# Patient Record
Sex: FEMALE | Race: WHITE | NOT HISPANIC OR LATINO | Employment: FULL TIME | URBAN - METROPOLITAN AREA
[De-identification: names, ages, dates, MRNs, and addresses within clinical notes are randomized per-mention and may not be internally consistent; named-entity substitution may affect disease eponyms.]

---

## 2023-06-26 ENCOUNTER — APPOINTMENT (OUTPATIENT)
Dept: LAB | Facility: LAB | Age: 33
End: 2023-06-26
Payer: COMMERCIAL

## 2023-06-26 LAB
APPEARANCE, URINE: ABNORMAL
BACTERIA, URINE: ABNORMAL /HPF
BILIRUBIN, URINE: NEGATIVE
BLOOD, URINE: ABNORMAL
COLOR, URINE: YELLOW
GLUCOSE, URINE: NEGATIVE MG/DL
KETONES, URINE: NEGATIVE MG/DL
LEUKOCYTE ESTERASE, URINE: NEGATIVE
MUCUS, URINE: ABNORMAL /LPF
NITRITE, URINE: NEGATIVE
PH, URINE: 6 (ref 5–8)
PROTEIN, URINE: NEGATIVE MG/DL
RBC, URINE: 2 /HPF (ref 0–5)
SPECIFIC GRAVITY, URINE: 1.02 (ref 1–1.03)
SQUAMOUS EPITHELIAL CELLS, URINE: 6 /HPF
UROBILINOGEN, URINE: <2 MG/DL (ref 0–1.9)
WBC, URINE: 1 /HPF (ref 0–5)

## 2023-11-06 PROCEDURE — 88175 CYTOPATH C/V AUTO FLUID REDO: CPT

## 2023-11-06 PROCEDURE — 87624 HPV HI-RISK TYP POOLED RSLT: CPT

## 2023-11-08 ENCOUNTER — LAB REQUISITION (OUTPATIENT)
Dept: LAB | Facility: HOSPITAL | Age: 33
End: 2023-11-08
Payer: COMMERCIAL

## 2023-11-08 DIAGNOSIS — R87.810 CERVICAL HIGH RISK HUMAN PAPILLOMAVIRUS (HPV) DNA TEST POSITIVE: ICD-10-CM

## 2023-11-08 DIAGNOSIS — Z01.419 ENCOUNTER FOR GYNECOLOGICAL EXAMINATION (GENERAL) (ROUTINE) WITHOUT ABNORMAL FINDINGS: ICD-10-CM

## 2023-11-08 DIAGNOSIS — Z11.51 ENCOUNTER FOR SCREENING FOR HUMAN PAPILLOMAVIRUS (HPV): ICD-10-CM

## 2023-11-29 LAB

## 2024-01-02 ENCOUNTER — APPOINTMENT (OUTPATIENT)
Dept: NEPHROLOGY | Facility: CLINIC | Age: 34
End: 2024-01-02
Payer: MEDICARE

## 2024-01-11 ENCOUNTER — APPOINTMENT (OUTPATIENT)
Dept: RADIOLOGY | Facility: HOSPITAL | Age: 34
End: 2024-01-11
Payer: COMMERCIAL

## 2024-01-11 ENCOUNTER — APPOINTMENT (OUTPATIENT)
Dept: CARDIOLOGY | Facility: HOSPITAL | Age: 34
End: 2024-01-11
Payer: COMMERCIAL

## 2024-01-11 ENCOUNTER — HOSPITAL ENCOUNTER (EMERGENCY)
Facility: HOSPITAL | Age: 34
Discharge: HOME | End: 2024-01-12
Attending: EMERGENCY MEDICINE
Payer: COMMERCIAL

## 2024-01-11 VITALS
DIASTOLIC BLOOD PRESSURE: 70 MMHG | RESPIRATION RATE: 17 BRPM | SYSTOLIC BLOOD PRESSURE: 145 MMHG | OXYGEN SATURATION: 98 % | HEIGHT: 58 IN | BODY MASS INDEX: 28.69 KG/M2 | HEART RATE: 109 BPM | TEMPERATURE: 99.3 F | WEIGHT: 136.69 LBS

## 2024-01-11 DIAGNOSIS — R10.9 RIGHT FLANK PAIN: Primary | ICD-10-CM

## 2024-01-11 DIAGNOSIS — N94.89 ADNEXAL MASS: ICD-10-CM

## 2024-01-11 DIAGNOSIS — R10.31 RIGHT LOWER QUADRANT ABDOMINAL PAIN: ICD-10-CM

## 2024-01-11 LAB
ALBUMIN SERPL-MCNC: 4.5 G/DL (ref 3.5–5)
ALP BLD-CCNC: 70 U/L (ref 35–125)
ALT SERPL-CCNC: 10 U/L (ref 5–40)
ANION GAP SERPL CALC-SCNC: 12 MMOL/L
APPEARANCE UR: CLEAR
AST SERPL-CCNC: 17 U/L (ref 5–40)
BASOPHILS # BLD AUTO: 0.03 X10*3/UL (ref 0–0.1)
BASOPHILS NFR BLD AUTO: 0.4 %
BILIRUB SERPL-MCNC: 0.3 MG/DL (ref 0.1–1.2)
BILIRUB UR STRIP.AUTO-MCNC: NEGATIVE MG/DL
BUN SERPL-MCNC: 9 MG/DL (ref 8–25)
CALCIUM SERPL-MCNC: 9.8 MG/DL (ref 8.5–10.4)
CHLORIDE SERPL-SCNC: 98 MMOL/L (ref 97–107)
CO2 SERPL-SCNC: 24 MMOL/L (ref 24–31)
COLOR UR: COLORLESS
CREAT SERPL-MCNC: 0.6 MG/DL (ref 0.4–1.6)
EGFRCR SERPLBLD CKD-EPI 2021: >90 ML/MIN/1.73M*2
EOSINOPHIL # BLD AUTO: 0.07 X10*3/UL (ref 0–0.7)
EOSINOPHIL NFR BLD AUTO: 0.9 %
ERYTHROCYTE [DISTWIDTH] IN BLOOD BY AUTOMATED COUNT: 12 % (ref 11.5–14.5)
GLUCOSE SERPL-MCNC: 103 MG/DL (ref 65–99)
GLUCOSE UR STRIP.AUTO-MCNC: NORMAL MG/DL
HCG UR QL IA.RAPID: NEGATIVE
HCT VFR BLD AUTO: 38.2 % (ref 36–46)
HGB BLD-MCNC: 13.6 G/DL (ref 12–16)
IMM GRANULOCYTES # BLD AUTO: 0.01 X10*3/UL (ref 0–0.7)
IMM GRANULOCYTES NFR BLD AUTO: 0.1 % (ref 0–0.9)
KETONES UR STRIP.AUTO-MCNC: NEGATIVE MG/DL
LACTATE BLDV-SCNC: 1 MMOL/L (ref 0.4–2)
LEUKOCYTE ESTERASE UR QL STRIP.AUTO: NEGATIVE
LYMPHOCYTES # BLD AUTO: 2.15 X10*3/UL (ref 1.2–4.8)
LYMPHOCYTES NFR BLD AUTO: 28.5 %
MCH RBC QN AUTO: 32.2 PG (ref 26–34)
MCHC RBC AUTO-ENTMCNC: 35.6 G/DL (ref 32–36)
MCV RBC AUTO: 91 FL (ref 80–100)
MONOCYTES # BLD AUTO: 0.61 X10*3/UL (ref 0.1–1)
MONOCYTES NFR BLD AUTO: 8.1 %
NEUTROPHILS # BLD AUTO: 4.68 X10*3/UL (ref 1.2–7.7)
NEUTROPHILS NFR BLD AUTO: 62 %
NITRITE UR QL STRIP.AUTO: NEGATIVE
NRBC BLD-RTO: 0 /100 WBCS (ref 0–0)
PH UR STRIP.AUTO: 6 [PH]
PLATELET # BLD AUTO: 295 X10*3/UL (ref 150–450)
POTASSIUM SERPL-SCNC: 3.6 MMOL/L (ref 3.4–5.1)
PROT SERPL-MCNC: 8.3 G/DL (ref 5.9–7.9)
PROT UR STRIP.AUTO-MCNC: NEGATIVE MG/DL
RBC # BLD AUTO: 4.22 X10*6/UL (ref 4–5.2)
RBC # UR STRIP.AUTO: NEGATIVE /UL
SODIUM SERPL-SCNC: 134 MMOL/L (ref 133–145)
SP GR UR STRIP.AUTO: 1
UROBILINOGEN UR STRIP.AUTO-MCNC: NORMAL MG/DL
WBC # BLD AUTO: 7.6 X10*3/UL (ref 4.4–11.3)

## 2024-01-11 PROCEDURE — 76856 US EXAM PELVIC COMPLETE: CPT | Mod: FOREIGN READ | Performed by: RADIOLOGY

## 2024-01-11 PROCEDURE — 74176 CT ABD & PELVIS W/O CONTRAST: CPT | Mod: FOREIGN READ | Performed by: RADIOLOGY

## 2024-01-11 PROCEDURE — 2500000004 HC RX 250 GENERAL PHARMACY W/ HCPCS (ALT 636 FOR OP/ED): Performed by: PHYSICIAN ASSISTANT

## 2024-01-11 PROCEDURE — 87040 BLOOD CULTURE FOR BACTERIA: CPT | Mod: TRILAB | Performed by: PHYSICIAN ASSISTANT

## 2024-01-11 PROCEDURE — 74176 CT ABD & PELVIS W/O CONTRAST: CPT

## 2024-01-11 PROCEDURE — 36415 COLL VENOUS BLD VENIPUNCTURE: CPT | Performed by: PHYSICIAN ASSISTANT

## 2024-01-11 PROCEDURE — 80053 COMPREHEN METABOLIC PANEL: CPT | Performed by: PHYSICIAN ASSISTANT

## 2024-01-11 PROCEDURE — 76856 US EXAM PELVIC COMPLETE: CPT | Mod: FR

## 2024-01-11 PROCEDURE — 81025 URINE PREGNANCY TEST: CPT | Performed by: PHYSICIAN ASSISTANT

## 2024-01-11 PROCEDURE — 93005 ELECTROCARDIOGRAM TRACING: CPT

## 2024-01-11 PROCEDURE — 81003 URINALYSIS AUTO W/O SCOPE: CPT | Performed by: PHYSICIAN ASSISTANT

## 2024-01-11 PROCEDURE — 83605 ASSAY OF LACTIC ACID: CPT | Performed by: PHYSICIAN ASSISTANT

## 2024-01-11 PROCEDURE — 96374 THER/PROPH/DIAG INJ IV PUSH: CPT

## 2024-01-11 PROCEDURE — 76830 TRANSVAGINAL US NON-OB: CPT | Mod: FOREIGN READ | Performed by: RADIOLOGY

## 2024-01-11 PROCEDURE — 2500000004 HC RX 250 GENERAL PHARMACY W/ HCPCS (ALT 636 FOR OP/ED): Performed by: CLINICAL NURSE SPECIALIST

## 2024-01-11 PROCEDURE — 85025 COMPLETE CBC W/AUTO DIFF WBC: CPT | Performed by: PHYSICIAN ASSISTANT

## 2024-01-11 PROCEDURE — 99285 EMERGENCY DEPT VISIT HI MDM: CPT | Mod: 25 | Performed by: EMERGENCY MEDICINE

## 2024-01-11 RX ORDER — ACETAMINOPHEN 325 MG/1
650 TABLET ORAL ONCE
Status: COMPLETED | OUTPATIENT
Start: 2024-01-11 | End: 2024-01-11

## 2024-01-11 RX ORDER — KETOROLAC TROMETHAMINE 30 MG/ML
15 INJECTION, SOLUTION INTRAMUSCULAR; INTRAVENOUS ONCE
Status: COMPLETED | OUTPATIENT
Start: 2024-01-11 | End: 2024-01-11

## 2024-01-11 RX ADMIN — ACETAMINOPHEN 650 MG: 325 TABLET ORAL at 19:17

## 2024-01-11 RX ADMIN — KETOROLAC TROMETHAMINE 15 MG: 30 INJECTION INTRAMUSCULAR; INTRAVENOUS at 19:17

## 2024-01-11 RX ADMIN — SODIUM CHLORIDE 1000 ML: 900 INJECTION, SOLUTION INTRAVENOUS at 18:29

## 2024-01-11 ASSESSMENT — PAIN SCALES - GENERAL: PAINLEVEL_OUTOF10: 6

## 2024-01-11 ASSESSMENT — PAIN - FUNCTIONAL ASSESSMENT: PAIN_FUNCTIONAL_ASSESSMENT: 0-10

## 2024-01-11 ASSESSMENT — COLUMBIA-SUICIDE SEVERITY RATING SCALE - C-SSRS
6. HAVE YOU EVER DONE ANYTHING, STARTED TO DO ANYTHING, OR PREPARED TO DO ANYTHING TO END YOUR LIFE?: NO
2. HAVE YOU ACTUALLY HAD ANY THOUGHTS OF KILLING YOURSELF?: NO
1. IN THE PAST MONTH, HAVE YOU WISHED YOU WERE DEAD OR WISHED YOU COULD GO TO SLEEP AND NOT WAKE UP?: NO

## 2024-01-11 NOTE — ED TRIAGE NOTES
Triage Note:  Date of Encounter: [unfilled]   MRN: [id number]    I saw the patient as the clinician in triage and performed a brief history and physical exam established acuity and order appropriate test to develop basic plan of care.  Patient will be seen by DARIANA and/or the physician who will independently evaluate  The patient.  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  No specific CVA tenderness    Plan/MDM:  Labs, CT, IV fluids and continued bedside monitoring for concern of pyelonephritis UTI or kidney stone        Please see subsequent provider note for details and disposition

## 2024-01-11 NOTE — Clinical Note
Mary PUENTE was seen and treated in our emergency department on 1/11/2024.  She may return to work on 01/13/2024.       If you have any questions or concerns, please don't hesitate to call.      Philip Sepulveda MD

## 2024-01-11 NOTE — ED PROVIDER NOTES
Department of Emergency Medicine   ED  Provider Note  Admit Date/RoomTime: 1/11/2024  6:21 PM  ED Room: [facility]        History of Present Illness:  Chief Complaint   Patient presents with    Flank Pain     Pt complains of right flank pain for a couple days that has gotten worse today.  Was seen at urgent care.           Mary PUENTE is a 33 y.o. female history of solitary kidney right side presenting to the ED for right flank pain, right lower abdominal pain, beginning 2 days.  Patient presents to the emergency department after being seen evaluated in the urgent care with a negative urine right flank pain right abdominal pain.  This is the location of her solitary kidney.  Reports has had pain in the past increases with stress but this has been more constant.  She describes it as sharp occasionally but a constant dull pain.  No nausea no vomiting no fever no cough congestion runny nose sore throat no pressure burning or frequency with urination no blood in her urine she has no concern for sexually transmitted diseases.  She is sexually active with 1 partner.    Patient was initially seen in triage orders placed by triage provider  Review of Systems:   Pertinent positives and negatives are stated within HPI, all other systems reviewed and are negative.        --------------------------------------------- PAST HISTORY ---------------------------------------------  Past Medical History:  has no past medical history on file.  Past Surgical History:  has no past surgical history on file.  Social History:    Family History: family history is not on file.. Unless otherwise noted, family history is non contributory  The patient’s home medications have been reviewed.  Allergies: Latex        ---------------------------------------------------PHYSICAL EXAM--------------------------------------    GENERAL APPEARANCE: Awake and alert.   VITAL SIGNS: As per the nurses' triage record.  Low-grade temperature noted.  HEENT:  "Normocephalic, atraumatic. Extraocular muscles are intact. Pupils equal round and reactive to light. Conjunctiva are pink. Negative scleral icterus. Mucous membranes are moist. Tongue in the midline. Pharynx was without erythema or exudates, uvula midline  NECK: Soft Nontender and supple, full gross ROM, no meningeal signs.  CHEST: Nontender to palpation. Clear to auscultation bilaterally. No rales, rhonchi, or wheezing.   HEART: S1, S2. Regular rate and rhythm. No murmurs, gallops or rubs.  Strong and equal pulses in the extremities.   ABDOMEN: Soft, mild tenderness in the right lower quadrant.  No rebound or guarding negative for Carlos sign.  Nondistended, positive bowel sounds, no palpable masses.  Back: No pain palpation of cervical thoracic or lumbar spine no step-offs crepitus or bruising no CVA tenderness no saddle paresthesias noted.  MUSCULCSKELETAL: The calves are nontender to palpation. Full gross active range of motion. Ambulating on own with no acute difficulties  NEUROLOGICAL: Awake, alert and oriented x 3. Power intact in the upper and lower extremities. Sensation is intact to light touch in the upper and lower extremities.   IMMUNOLOGICAL: No lymphatic streaking noted   DERM: No petechiae, rashes, or ecchymoses.          ------------------------- NURSING NOTES AND VITALS REVIEWED ---------------------------  The nursing notes within the ED encounter and vital signs as below have been reviewed by myself  /70   Pulse 109   Temp 37.4 °C (99.3 °F)   Resp 17   Ht 1.473 m (4' 10\")   Wt 62 kg (136 lb 11 oz)   SpO2 98%   BMI 28.57 kg/m²     Oxygen Saturation Interpretation: Normal      The patient’s available past medical records and past encounters were reviewed.          -----------------------DIAGNOSTIC RESULTS------------------------  LABS:    Labs Reviewed   COMPREHENSIVE METABOLIC PANEL - Abnormal       Result Value    Glucose 103 (*)     Sodium 134      Potassium 3.6      Chloride 98   "    Bicarbonate 24      Urea Nitrogen 9      Creatinine 0.60      eGFR >90      Calcium 9.8      Albumin 4.5      Alkaline Phosphatase 70      Total Protein 8.3 (*)     AST 17      Bilirubin, Total 0.3      ALT 10      Anion Gap 12     URINALYSIS WITH REFLEX CULTURE AND MICROSCOPIC - Abnormal    Color, Urine Colorless (*)     Appearance, Urine Clear      Specific Gravity, Urine 1.003 (*)     pH, Urine 6.0      Protein, Urine NEGATIVE      Glucose, Urine Normal      Blood, Urine NEGATIVE      Ketones, Urine NEGATIVE      Bilirubin, Urine NEGATIVE      Urobilinogen, Urine Normal      Nitrite, Urine NEGATIVE      Leukocyte Esterase, Urine NEGATIVE     HCG, URINE, QUALITATIVE - Normal    HCG, Urine NEGATIVE     BLOOD GAS LACTIC ACID, VENOUS - Normal    POCT Lactate, Venous 1.0     BLOOD CULTURE   BLOOD CULTURE   CBC WITH AUTO DIFFERENTIAL    WBC 7.6      nRBC 0.0      RBC 4.22      Hemoglobin 13.6      Hematocrit 38.2      MCV 91      MCH 32.2      MCHC 35.6      RDW 12.0      Platelets 295      Neutrophils % 62.0      Immature Granulocytes %, Automated 0.1      Lymphocytes % 28.5      Monocytes % 8.1      Eosinophils % 0.9      Basophils % 0.4      Neutrophils Absolute 4.68      Immature Granulocytes Absolute, Automated 0.01      Lymphocytes Absolute 2.15      Monocytes Absolute 0.61      Eosinophils Absolute 0.07      Basophils Absolute 0.03     URINALYSIS WITH REFLEX CULTURE AND MICROSCOPIC    Narrative:     The following orders were created for panel order Urinalysis with Reflex Culture and Microscopic.  Procedure                               Abnormality         Status                     ---------                               -----------         ------                     Urinalysis with Reflex C...[[id number]]  Abnormal            Final result               Extra Urine Gray Tube[[id number]]                                                         Please view results for these tests on the individual orders.   EXTRA  URINE GRAY TUBE       As interpreted by me, the above displayed labs are abnormal. All other labs obtained during this visit were within normal range or not returned as of this dictation.          CT abdomen pelvis wo IV contrast   Final Result   There is a 3.4 cm smooth bordered relatively low-density mass superior   to the bladder and anterior to the fundus of the uterus that is new   when compared to the prior CT.  This could represent left adnexal mass   but this is not definite..  The CT abdomen and pelvis otherwise is   unchanged.  Again visible is absence of the left kidney.  There are no   right renal calculi and no evidence of hydronephrosis.   Signed by Rod Gonzalez MD      US PELVIS TRANSABDOMINAL WITH TRANSVAGINAL    (Results Pending)           CT abdomen pelvis wo IV contrast   Final Result   There is a 3.4 cm smooth bordered relatively low-density mass superior   to the bladder and anterior to the fundus of the uterus that is new   when compared to the prior CT.  This could represent left adnexal mass   but this is not definite..  The CT abdomen and pelvis otherwise is   unchanged.  Again visible is absence of the left kidney.  There are no   right renal calculi and no evidence of hydronephrosis.   Signed by Rod Gonzalez MD      US PELVIS TRANSABDOMINAL WITH TRANSVAGINAL    (Results Pending)           ------------------------------ ED COURSE/MEDICAL DECISION MAKING----------------------  Medical Decision Making:   Exam: A medically appropriate exam performed, outlined above, given the known history and presentation.    History obtained from: Review of medical record nursing notes patient      Social Determinants of Health considered during this visit: Was at home with family      PAST MEDICAL HISTORY/Chronic Conditions Affecting Care     has no past medical history on file.       CC/HPI Summary, Social Determinants of health, Records Reviewed, DDx, testing done/not done, ED Course, Reassessment,  disposition considerations/shared decision making with patient, consults, disposition:   Presents with right flank pain right lower abdominal pain  Plan  Normal saline  CT abdomen pelvis There is a 3.4 cm smooth bordered relatively low-density mass superior  to the bladder and anterior to the fundus of the uterus that is new  when compared to the prior CT.  This could represent left adnexal mass  but this is not definite..  The CT abdomen and pelvis otherwise is  unchanged.  Again visible is absence of the left kidney.  There are no  right renal calculi and no evidence of hydronephrosis.  Blood cultures  Lactic acid  CBC  CMP  Urine  Pregnancy  Toradol  Tylenol  Medical Decision Making/Differential Diagnosis:  Differentials include not limited to kidney stone versus UTI versus pregnancy versus acute appendicitis versus electrolyte abnormality versus dehydration patient denies any concern for any STD denies vaginal complaints.  Review:  Pregnancy negative  Glucose 103  Electrolytes within normal limits  Creatinine 0.6  BUN 9  LFTs within normal limits  Lactic acid 1  Urine is negative for leukocytes nitrites and blood  White blood cell count 7.6  Hemoglobin 13.6  Patient presented with right lower quadrant pain electrolytes within normal limits.  Normal renal function.  LFTs within normal limits lactic acid normal.  Urine is not consistent with UTI no blood.  Patient is not anemic.  She is not pregnant.  CT of the abdomen pelvis showed a 3.4 cm smooth bordered relatively low-density mass superior to the bladder and anterior to the fundus of the uterus that is new from her previous CT.  Concerning for adnexal mass but could not be definite.  Ultrasound ordered ultrasound is pending at this time.  Appendix normal on exam patient seen and evaluated with attending physician Dr. Goins.   Due to the end of my shift [id number] report was given to oncoming physician Dr. Sepulveda .  Will follow-up on ultrasound results and  discharge planning.  Patient is been updated on plan of care.  Agreeable to plan.  PROCEDURES  Unless otherwise noted below, none      CONSULTS:   None      ED Course as of 01/11/24 2244   Thu Jan 11, 2024 2238 Patient updated on plan of care.  Agreeable to plan awaiting ultrasound results. [TB]      ED Course User Index  [TB] EDWARD Ahmadi-CNP         Diagnoses as of 01/11/24 2244   Right flank pain   Right lower quadrant abdominal pain         This patient has remained hemodynamically stable during their ED course.      Critical Care: none       Counseling:  The emergency provider has spoken with the patient and discussed today’s results, in addition to providing specific details for the plan of care and counseling regarding the diagnosis and prognosis.  Questions are answered at this time and they are agreeable with the plan.         --------------------------------- IMPRESSION AND DISPOSITION ---------------------------------    IMPRESSION  1. Right flank pain    2. Right lower quadrant abdominal pain        DISPOSITION  Disposition: Discharge pending    Patient condition is stable        NOTE: This report was transcribed using voice recognition software. Every effort was made to ensure accuracy; however, inadvertent computerized transcription errors may be present      JANUSZ Ahmadi  01/11/24 2244

## 2024-01-12 LAB
ATRIAL RATE: 115 BPM
P AXIS: 40 DEGREES
P OFFSET: 204 MS
P ONSET: 147 MS
PR INTERVAL: 154 MS
Q ONSET: 224 MS
QRS COUNT: 19 BEATS
QRS DURATION: 76 MS
QT INTERVAL: 310 MS
QTC CALCULATION(BAZETT): 428 MS
QTC FREDERICIA: 384 MS
R AXIS: 42 DEGREES
T AXIS: 21 DEGREES
T OFFSET: 379 MS
VENTRICULAR RATE: 115 BPM

## 2024-01-12 NOTE — ED PROVIDER NOTES
HPI   Chief Complaint   Patient presents with    Flank Pain     Pt complains of right flank pain for a couple days that has gotten worse today.  Was seen at urgent care.         HPI       Patient was signed out to me awaiting results of ultrasound.             Johnny Coma Scale Score: 15                  Patient History   No past medical history on file.  No past surgical history on file.  No family history on file.  Social History     Tobacco Use    Smoking status: Not on file    Smokeless tobacco: Not on file   Substance Use Topics    Alcohol use: Not on file    Drug use: Not on file       Physical Exam   ED Triage Vitals [01/11/24 1805]   Temp Heart Rate Resp BP   37.4 °C (99.3 °F) (!) 137 16 (!) 168/115      SpO2 Temp src Heart Rate Source Patient Position   100 % -- -- --      BP Location FiO2 (%)     -- --       Physical Exam    ED Course & [facility]   ED Course as of 01/12/24 0001   Thu Jan 11, 2024 2231 Patient updated on plan of care.  Agreeable to plan awaiting ultrasound results. [TB]      ED Course User Index  [TB] Carline Garber, APRN-CNP         Diagnoses as of 01/12/24 0001   Right flank pain   Right lower quadrant abdominal pain   Adnexal mass       Pelvic ultrasound was ordered by me interpreted by radiology.    IMPRESSION:  Septate uterus.  4.1 cm nonspecific left adnexal lesion.  This does appear to contain  some vascularity.  Would advise a short follow-up in 6 weeks.  If this  does not resolve other evaluation would be indicated.  Bilateral ovaries not visualized.      Ultrasound appears to show a slightly vascular left adnexal lesion.  Combination of this and the CT was felt to be due to hemorrhagic cyst.  Ovaries otherwise not identified.  Her pain however is most on the right side.  Her workup otherwise is negative.  No signs of infection, no signs of kidney stone, pyelonephritis or appendicitis.  Otherwise she appears well.  Resting comfortably here.  At this point will discharge home.  She  does have a gynecologist she follows with regularly at [facility].  I advised her to contact them in the morning to arrange immediate follow-up.  She will need repeat ultrasound.  Will discharge home and follow-up with OB/GYN    Medical Decision Making      Procedure  Procedures     Philip Sepulveda MD  01/12/24 0001

## 2024-01-12 NOTE — PROGRESS NOTES
Attestation note/supervisory note for ANTWAN Garber      The patient is a 33-year-old female presenting to the emergency department for evaluation of right-sided flank pain and right lower quadrant abdominal pain.  She has had symptoms for the past 2 to 3 days.  It is a dull aching pain.  No specific better or worse.  No radiation.  Fairly constant.  She denies any nausea, vomiting or diarrhea.  No urinary complaints.  No vaginal discharge.  No diarrhea or constipation.  No sick contacts or recent travel.  No previous abdominal surgeries.  All pertinent positives and negatives are recorded above.  All other systems reviewed and otherwise negative.  Vital signs with hypertension and tachycardia but otherwise within normal limits.  Physical exam with a well-nourished well-developed female in no acute distress.  HEENT exam within normal limits.  She has no evidence of airway compromise or respiratory distress.  Abdominal exam with mild tenderness to palpation in the right lower quadrant.  No rebound or guarding.  No palpable masses.  She has no focal midline neck or back pain with palpation.  She has no gross motor, neurologic or vascular deficits on exam.  She is able to ambulate without difficulty.      EKG with sinus tachycardia 115 bpm, normal axis, normal voltage, normal ST segment, normal T waves      IV fluids, IV Toradol and oral acetaminophen ordered.      Diagnostic labs without significant abnormality      lactic acid 1.0      CT abdomen pelvis wo IV contrast   Final Result   There is a 3.4 cm smooth bordered relatively low-density mass superior   to the bladder and anterior to the fundus of the uterus that is new   when compared to the prior CT.  This could represent left adnexal mass   but this is not definite..  The CT abdomen and pelvis otherwise is   unchanged.  Again visible is absence of the left kidney.  There are no   right renal calculi and no evidence of hydronephrosis.   Signed by Rod Gonzalez MD       US PELVIS TRANSABDOMINAL WITH TRANSVAGINAL    (Results Pending)        The CT abdomen pelvis shows evidence of a low-density mass superior to the bladder and anterior to the uterus that was not seen on previous imaging.  An ultrasound was ordered but was pending at the time of my departure.        Impression/diagnosis  Right lower quadrant abdominal pain  Right-sided flank pain  Hypertension, unspecified      I personally saw the patient and performed a substantive portion of the visit including all aspects of the medical decision making.      I reviewed the results of the diagnostic labs and diagnostic imaging.  Formal radiology reading was completed by the radiologist      Pt care turned over to Dr Sepulveda with results of the pelvic ultrasound pending    Luisa Goins MD

## 2024-01-12 NOTE — DISCHARGE INSTRUCTIONS
Contact your OB/GYN in the morning to arrange immediate follow-up.  Return to the ER immediately for any new or worsening symptoms.

## 2024-01-16 LAB
BACTERIA BLD CULT: NORMAL
BACTERIA BLD CULT: NORMAL

## 2024-03-11 ENCOUNTER — OFFICE VISIT (OUTPATIENT)
Dept: PRIMARY CARE | Facility: CLINIC | Age: 34
End: 2024-03-11
Payer: MEDICARE

## 2024-03-11 VITALS
DIASTOLIC BLOOD PRESSURE: 93 MMHG | BODY MASS INDEX: 29.18 KG/M2 | SYSTOLIC BLOOD PRESSURE: 143 MMHG | WEIGHT: 139 LBS | HEIGHT: 58 IN | HEART RATE: 95 BPM | OXYGEN SATURATION: 98 %

## 2024-03-11 DIAGNOSIS — Q60.0 UNILATERAL CONGENITAL ABSENCE OF KIDNEY: ICD-10-CM

## 2024-03-11 DIAGNOSIS — Z76.89 ENCOUNTER TO ESTABLISH CARE: Primary | ICD-10-CM

## 2024-03-11 DIAGNOSIS — Z82.61 FAMILY HISTORY OF RHEUMATOID ARTHRITIS: ICD-10-CM

## 2024-03-11 DIAGNOSIS — Z13.220 LIPID SCREENING: ICD-10-CM

## 2024-03-11 DIAGNOSIS — M25.50 ARTHRALGIA, UNSPECIFIED JOINT: ICD-10-CM

## 2024-03-11 DIAGNOSIS — Z78.9 VEGETARIAN: ICD-10-CM

## 2024-03-11 PROCEDURE — 99214 OFFICE O/P EST MOD 30 MIN: CPT | Performed by: NURSE PRACTITIONER

## 2024-03-11 PROCEDURE — 1036F TOBACCO NON-USER: CPT | Performed by: NURSE PRACTITIONER

## 2024-03-11 NOTE — PROGRESS NOTES
"Subjective   Patient ID: Mary Carlos is a 33 y.o. female who presents for New Patient Visit (Patient is here today to establish care with a new pcp. No complaints. ).    33 year old female here to establish care. Medical HX significant for chronic joint pain and congenital unilateral kidney.   She sees nephro q year. Labs reviewed- Protein in blood work, neg in urine.   Family HX reviewed- mom had RA, lung Ca mets.   Sister had Thyroid cancer treated.   She takes no meds  No nicotene.   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 She has a Septate uterus and wants surgery in the near future. Does not have children and does not want surgery to increase chance of pregnancy.   Askign for labs to be ordered so she gets them prior to her Nephro OV scheduled in June         Review of Systems    Objective   BP (!) 143/93   Pulse 95   Ht 1.473 m (4' 10\")   Wt 63 kg (139 lb)   SpO2 98%   BMI 29.05 kg/m²     Physical Exam  Vitals and nursing note reviewed.   Constitutional:       General: She is not in acute distress.     Appearance: Normal appearance. She is normal weight.   HENT:      Head: Normocephalic and atraumatic.      Right Ear: External ear normal.      Left Ear: External ear normal.      Nose: Nose normal.      Mouth/Throat:      Mouth: Mucous membranes are moist.      Pharynx: Oropharynx is clear.   Eyes:      Extraocular Movements: Extraocular movements intact.      Conjunctiva/sclera: Conjunctivae normal.      Pupils: Pupils are equal, round, and reactive to light.   Neck:      Vascular: No carotid bruit.   Cardiovascular:      Rate and Rhythm: Normal rate and regular rhythm.      Pulses: Normal pulses.      Heart sounds: Normal heart sounds.   Pulmonary:      Effort: Pulmonary effort is normal.      Breath sounds: Normal breath sounds.   Musculoskeletal:      Cervical back: Normal range of motion and neck supple.   Lymphadenopathy:      Cervical: No cervical adenopathy.   Skin:     General: " Skin is warm and dry.      Capillary Refill: Capillary refill takes less than 2 seconds.   Neurological:      Mental Status: She is alert and oriented to person, place, and time.   Psychiatric:         Mood and Affect: Mood normal.         Behavior: Behavior normal.         Thought Content: Thought content normal.         Judgment: Judgment normal.         Assessment/Plan   Diagnoses and all orders for this visit:  Encounter to establish care  Unilateral congenital absence of kidney  Comments:  Followed by nephrology.  Orders:  -     Vitamin D 25-Hydroxy,Total (for eval of Vitamin D levels); Future  -     Comprehensive Metabolic Panel; Future  -     CBC and Auto Differential; Future  -     Urinalysis with Reflex Microscopic; Future  Lipid screening  Comments:  Update fasting lipid panel  Orders:  -     Lipid Panel; Future  Arthralgia, unspecified joint  Comments:  Chronic joint pain with family history of autoimmune RA  Orders:  -     Thyroid Stimulating Hormone; Future  -     Thyroxine, Free; Future  -     ADRIÁN with Reflex to JANET; Future  -     Rheumatoid factor; Future  Family history of rheumatoid arthritis  Comments:  Check rheumatoid factor and ADRIÁN.  ADRIÁN was negative in 2022  Orders:  -     ADRIÁN with Reflex to JANET; Future  -     Rheumatoid factor; Future  Vegetarian  Comments:  Need supplementation of vitamin B12 daily 1000 mcg sublingual  Orders:  -     Vitamin B12; Future  Other orders  -     Follow Up In Primary Care - Health Maintenance; Future

## 2024-04-13 ENCOUNTER — LAB (OUTPATIENT)
Dept: LAB | Facility: LAB | Age: 34
End: 2024-04-13
Payer: MEDICARE

## 2024-04-13 DIAGNOSIS — M25.50 ARTHRALGIA, UNSPECIFIED JOINT: ICD-10-CM

## 2024-04-13 DIAGNOSIS — Z13.220 LIPID SCREENING: ICD-10-CM

## 2024-04-13 DIAGNOSIS — Z78.9 VEGETARIAN: ICD-10-CM

## 2024-04-13 DIAGNOSIS — Z82.61 FAMILY HISTORY OF RHEUMATOID ARTHRITIS: ICD-10-CM

## 2024-04-13 DIAGNOSIS — Q60.0 UNILATERAL CONGENITAL ABSENCE OF KIDNEY: ICD-10-CM

## 2024-04-13 LAB
25(OH)D3 SERPL-MCNC: 17 NG/ML (ref 30–100)
ALBUMIN SERPL BCP-MCNC: 4.6 G/DL (ref 3.4–5)
ALP SERPL-CCNC: 59 U/L (ref 33–110)
ALT SERPL W P-5'-P-CCNC: 10 U/L (ref 7–45)
ANION GAP SERPL CALC-SCNC: 16 MMOL/L (ref 10–20)
AST SERPL W P-5'-P-CCNC: 16 U/L (ref 9–39)
BASOPHILS # BLD AUTO: 0.03 X10*3/UL (ref 0–0.1)
BASOPHILS NFR BLD AUTO: 0.6 %
BILIRUB SERPL-MCNC: 0.5 MG/DL (ref 0–1.2)
BUN SERPL-MCNC: 10 MG/DL (ref 6–23)
CALCIUM SERPL-MCNC: 9.8 MG/DL (ref 8.6–10.3)
CHLORIDE SERPL-SCNC: 99 MMOL/L (ref 98–107)
CHOLEST SERPL-MCNC: 238 MG/DL (ref 0–199)
CHOLESTEROL/HDL RATIO: 3
CO2 SERPL-SCNC: 25 MMOL/L (ref 21–32)
CREAT SERPL-MCNC: 0.77 MG/DL (ref 0.5–1.05)
EGFRCR SERPLBLD CKD-EPI 2021: >90 ML/MIN/1.73M*2
EOSINOPHIL # BLD AUTO: 0.11 X10*3/UL (ref 0–0.7)
EOSINOPHIL NFR BLD AUTO: 2.2 %
ERYTHROCYTE [DISTWIDTH] IN BLOOD BY AUTOMATED COUNT: 11.9 % (ref 11.5–14.5)
GLUCOSE SERPL-MCNC: 80 MG/DL (ref 74–99)
HCT VFR BLD AUTO: 40.8 % (ref 36–46)
HDLC SERPL-MCNC: 79 MG/DL
HGB BLD-MCNC: 14.2 G/DL (ref 12–16)
IMM GRANULOCYTES # BLD AUTO: 0.01 X10*3/UL (ref 0–0.7)
IMM GRANULOCYTES NFR BLD AUTO: 0.2 % (ref 0–0.9)
LDLC SERPL CALC-MCNC: 130 MG/DL
LYMPHOCYTES # BLD AUTO: 1.5 X10*3/UL (ref 1.2–4.8)
LYMPHOCYTES NFR BLD AUTO: 30.7 %
MCH RBC QN AUTO: 32.7 PG (ref 26–34)
MCHC RBC AUTO-ENTMCNC: 34.8 G/DL (ref 32–36)
MCV RBC AUTO: 94 FL (ref 80–100)
MONOCYTES # BLD AUTO: 0.42 X10*3/UL (ref 0.1–1)
MONOCYTES NFR BLD AUTO: 8.6 %
NEUTROPHILS # BLD AUTO: 2.82 X10*3/UL (ref 1.2–7.7)
NEUTROPHILS NFR BLD AUTO: 57.7 %
NON HDL CHOLESTEROL: 159 MG/DL (ref 0–149)
NRBC BLD-RTO: 0 /100 WBCS (ref 0–0)
PLATELET # BLD AUTO: 345 X10*3/UL (ref 150–450)
POTASSIUM SERPL-SCNC: 4.2 MMOL/L (ref 3.5–5.3)
PROT SERPL-MCNC: 7.9 G/DL (ref 6.4–8.2)
RBC # BLD AUTO: 4.34 X10*6/UL (ref 4–5.2)
RHEUMATOID FACT SER NEPH-ACNC: <10 IU/ML (ref 0–15)
SODIUM SERPL-SCNC: 136 MMOL/L (ref 136–145)
T4 FREE SERPL-MCNC: 0.88 NG/DL (ref 0.61–1.12)
TRIGL SERPL-MCNC: 145 MG/DL (ref 0–149)
TSH SERPL-ACNC: 2.35 MIU/L (ref 0.44–3.98)
VIT B12 SERPL-MCNC: 420 PG/ML (ref 211–911)
VLDL: 29 MG/DL (ref 0–40)
WBC # BLD AUTO: 4.9 X10*3/UL (ref 4.4–11.3)

## 2024-04-13 PROCEDURE — 80061 LIPID PANEL: CPT

## 2024-04-13 PROCEDURE — 86038 ANTINUCLEAR ANTIBODIES: CPT

## 2024-04-13 PROCEDURE — 86431 RHEUMATOID FACTOR QUANT: CPT

## 2024-04-13 PROCEDURE — 84439 ASSAY OF FREE THYROXINE: CPT

## 2024-04-13 PROCEDURE — 82607 VITAMIN B-12: CPT

## 2024-04-13 PROCEDURE — 82306 VITAMIN D 25 HYDROXY: CPT

## 2024-04-13 PROCEDURE — 85025 COMPLETE CBC W/AUTO DIFF WBC: CPT

## 2024-04-13 PROCEDURE — 84443 ASSAY THYROID STIM HORMONE: CPT

## 2024-04-13 PROCEDURE — 36415 COLL VENOUS BLD VENIPUNCTURE: CPT

## 2024-04-13 PROCEDURE — 80053 COMPREHEN METABOLIC PANEL: CPT

## 2024-04-15 LAB — ANA SER QL HEP2 SUBST: NEGATIVE

## 2024-06-03 ENCOUNTER — OFFICE VISIT (OUTPATIENT)
Dept: NEPHROLOGY | Facility: CLINIC | Age: 34
End: 2024-06-03
Payer: MEDICARE

## 2024-06-03 VITALS
HEART RATE: 97 BPM | SYSTOLIC BLOOD PRESSURE: 122 MMHG | WEIGHT: 141 LBS | DIASTOLIC BLOOD PRESSURE: 85 MMHG | OXYGEN SATURATION: 98 % | BODY MASS INDEX: 29.6 KG/M2 | RESPIRATION RATE: 16 BRPM | TEMPERATURE: 97.3 F | HEIGHT: 58 IN

## 2024-06-03 DIAGNOSIS — Q60.0 UNILATERAL CONGENITAL ABSENCE OF KIDNEY: Primary | ICD-10-CM

## 2024-06-03 PROCEDURE — 99213 OFFICE O/P EST LOW 20 MIN: CPT | Performed by: INTERNAL MEDICINE

## 2024-06-03 NOTE — PROGRESS NOTES
Subjective       Mary Carlos is a 34 y.o. female who has no significant past medical history is coming to see me today for follow-up for congenital left kidney agenesis    Last office visit June 2023.  Mary came alone today.  Kidney function remains to be normal.  She has recent flank pain that mandated urgent care/emergency room visit.  She had CAT scan done that showed possible ovarian lesion.  She saw OB/GYN for follow-up.  Today, no flank pain or abdominal pain.  No lower urinary tract symptoms.  Mary is not planning on pregnancy.  She suffered from septated uterus.  I discussed with her possible increased risk of pregnancy related kidney complications with single kidney.  Blood pressure is good.  Currently not on blood pressure medications.  Since all is stable at this time we will follow-up only as needed    Prior notes      Last office visit a year ago. Mary came alone today. She got  last summer. Few episodes of UTI 3 times last year all were symptomatic and treated with antibiotic. Today, she denies urinary tract symptoms. She did not do urine analysis as instructed prior to this visit. She did some blood work done and all results were discussed with her in details including normal serum creatinine within normal electrolytes. There is mild degree of acidosis with bicarb 22-of note she is vegetarian and does not consume meat. No history of diarrhea. Blood pressure is elevated today and she attributes this to being in bachelor party last weekend and she is recovering. She does not carry diagnosis of hypertension. Based on blood pressure checks her blood pressure is normal at home. No notes of blood in the urine     Per prior notes     Mary came alone today. She reports to be healthy. She feels in her baseline state of health. A month ago she presented with persistent abdominal/pelvic pain. She underwent CAT scan with IV contrast and she was found to have absence left kidney. She was  "diagnosed with UTI and received 1 week course of antibiotic. Symptoms improved significantly. Today, she denies pain or burning with urination. She denies abdominal/pelvic pain. No leg swelling or shortness of breath. No recent weight gain. No recent hair fall. No recent skin rash. She denies foam or blood in the urine. Urine dipstick today with no albumin but positive blood. The family is vegetarian. She reports history of ovarian cysts. She reports history of NSAID use but not consistent     Social history: She is a cleaning lady, quit smoking 6 months ago, , no children  Family history: She denies congenital anomalies of kidneys in the family as far she can tell     Objective   /85 (BP Location: Left arm, Patient Position: Standing, BP Cuff Size: Adult)   Pulse 97   Temp 36.3 °C (97.3 °F)   Resp 16   Ht 1.473 m (4' 10\")   Wt 64 kg (141 lb)   SpO2 98%   BMI 29.47 kg/m²   Wt Readings from Last 3 Encounters:   06/03/24 64 kg (141 lb)   03/11/24 63 kg (139 lb)   01/11/24 62 kg (136 lb 11 oz)       Physical Exam    General appearance: no distress awake and alert on room air, euvolemic on exam  Eyes: non-icteric  HEENT: atrumatic head, PEERLA, moist mucosa  Skin: no apparent rash  Heart: NSR, S1, S2 normal, no murmur or gallop  Lungs: Symmetrical expansion,CTA bilat no wheezing/crackles  Abdomen: soft, nt/nd, obese  Extremities: no edema bilat  Neuro: No FND,asterixis, no focal deficits noticed        Review of Systems     Constitutional: no fever, no chills, no recent weight gain and no recent weight loss.   Eyes: no blurred vision and no diplopia.   ENT: no hearing loss, no earache, no sore throat, no swollen glands in the neck and no nasal discharge.   Cardiovascular: no chest pain, no palpitations and no lower extremity edema.   Respiratory: no shortness of breath, no chronic cough and no shortness of breath during exertion.   Gastrointestinal: no abdominal pain, no constipation, no heartburn, " "no vomiting, no bloody stools and no change in bowel movements.   Genitourinary: no dysuria and no hematuria.   Musculoskeletal: no arthralgias and no myalgias.   Skin: no rashes and no skin lesions.   Neurological: no headaches and no dizziness.   Psychiatric: no confusion, no depression and no anxiety.   Endocrine: no heat intolerance, no cold intolerance, appetite not increased, no thyroid disorder, no increased urinary frequency and no dry skin.   Hematologic/Lymphatic: does not bleed easily and does not bruise easily.   All other systems have been reviewed and are negative for complaint.         Data Review                   No results found for: \"URICACID\"        No results found for: \"HGBA1C\"              No lab exists for component: \"CR\", \"PHOSPHORUS\"        Albumin/Creatine Ratio   Date Value Ref Range Status   06/22/2022 5.9 0.0 - 30.0 ug/mg crt Final            RFP  Recent Labs     04/13/24  0846 01/11/24  1819 06/20/23  1223 09/21/22  0850 06/22/22  1329    134 137 134 137   K 4.2 3.6 3.7 4.7 4.3   CL 99 98 100 97 101   CO2 25 24 23* 24 26   BUN 10 9 13 7* 10   CREATININE 0.77 0.60 0.7 0.7 0.76   GLUCOSE 80 103* 84 83 77   CALCIUM 9.8 9.8 10.0 9.7 9.4   PHOS  --   --   --   --  2.3*   EGFR >90 >90 119 119  --    ANIONGAP 16 12 14 13 14        Urineanalysis  Recent Labs     01/11/24 1933 06/26/23  1335 06/22/22  1413   COLORU Colorless* YELLOW RADHA   APPEARANCEU Clear HAZY HAZY   SPECGRAVU 1.003* 1.023 1.017   NILO 6.0 6.0 6.0   PROTUR NEGATIVE NEGATIVE 10  NEGATIVE   GLUCOSEU Normal NEGATIVE NEGATIVE   BLOODU NEGATIVE SMALL(1+)* SMALL(1+)*   KETONESU NEGATIVE NEGATIVE NEGATIVE   BILIRUBINU NEGATIVE NEGATIVE NEGATIVE   NITRITEU NEGATIVE NEGATIVE NEGATIVE   LEUKOCYTESU NEGATIVE NEGATIVE TRACE*       Urine Electrolytes  Recent Labs     01/11/24 1933 06/26/23  1335 06/22/22  1413   CREATU  --   --  122.0  122.0   PROTUR NEGATIVE NEGATIVE 10  NEGATIVE   UTPCR  --   --  0.08   MICROALBCREA  --   --  " 5.9        Urine Micro  Recent Labs     06/26/23  1335 06/22/22  1413   WBCU 1 26*   RBCU 2 4   SQUAMEPIU 6 19   BACTERIAU 1+* 2+*   MUCUSU FEW  --         Iron  Recent Labs     06/22/22  1329   IRON 82   TIBC 496*   IRONSAT 17*   FERRITIN 32          No current outpatient medications on file prior to visit.     No current facility-administered medications on file prior to visit.           Assessment and Plan     Mary Carlos is a 34 y.o. female who has no significant past medical history is coming to see me today for follow-up for congenital left kidney agenesis      Impression  #Congenital absence of left kidney with prior history of microscopic hematuria.  Currently microscope hematuria resolved  -CAT scan done in May 2022 was reviewed and showed enlarged right kidney with absent left kidney. No evidence of hydronephrosis or hydroureter  -Within normal kidney function panel based on blood work done in June 2024.  Serum creatinine baseline less than 1  -Urine dipstick earlier was negative albumin but was positive microscopic hematuria. Repeat urinalysis with no RBCs.      #No history of hypertension-blood pressure readings are accepted     # Within normal acid level    Follow-up as needed  Justin Peralta MD, MS, RODERICK PETERSON   Clinical  - [facility] [employer]   Nephrologist - [facility] - [facility]

## 2024-06-03 NOTE — PATIENT INSTRUCTIONS
Dear SERA   It was nice seeing you in the nephrology clinic today     Today we discussed the following:  #Congenital absence of the left kidney  -Normal kidney function      # No blood or protein in the urine     #Blood pressure is accepted-currently not on blood pressure medications          Follow healthy lifestyle. Limit salt intake as possible. Avoid over-the-counter nonsteroidal anti-inflammatory drugs. Tylenol is okay if needed.  You will need closer follow-up if you get pregnant for possible increased risk of preeclampsia/albuminuria or elevated blood pressure with pregnancy     Follow-up as needed     Please call our office if you have any question  Thank you for coming to see me today     Justin Peralta MD, MS, RODERICK PETERSON  Clinical  - [facility] [employer]  Nephrologist - [facility] - [facility]

## 2024-11-07 PROCEDURE — 88175 CYTOPATH C/V AUTO FLUID REDO: CPT

## 2024-11-08 ENCOUNTER — LAB REQUISITION (OUTPATIENT)
Dept: LAB | Facility: HOSPITAL | Age: 34
End: 2024-11-08
Payer: MEDICARE

## 2024-11-08 DIAGNOSIS — Z01.419 ENCOUNTER FOR GYNECOLOGICAL EXAMINATION (GENERAL) (ROUTINE) WITHOUT ABNORMAL FINDINGS: ICD-10-CM

## 2024-11-08 DIAGNOSIS — Z11.51 ENCOUNTER FOR SCREENING FOR HUMAN PAPILLOMAVIRUS (HPV): ICD-10-CM

## 2024-11-19 LAB
CYTOLOGY CMNT CVX/VAG CYTO-IMP: NORMAL
LAB AP HPV GENOTYPE QUESTION: YES
LAB AP HPV HR: NORMAL
LABORATORY COMMENT REPORT: NORMAL
LMP START DATE: NORMAL
PATH REPORT.TOTAL CANCER: NORMAL

## 2025-02-25 ENCOUNTER — PREP FOR PROCEDURE (OUTPATIENT)
Dept: OBSTETRICS AND GYNECOLOGY | Facility: HOSPITAL | Age: 35
End: 2025-02-25
Payer: MEDICARE

## 2025-02-25 RX ORDER — ACETAMINOPHEN 325 MG/1
975 TABLET ORAL ONCE
OUTPATIENT
Start: 2025-02-25 | End: 2025-02-25

## 2025-02-25 RX ORDER — CEFAZOLIN SODIUM 2 G/50ML
2 SOLUTION INTRAVENOUS ONCE
OUTPATIENT
Start: 2025-02-25 | End: 2025-02-25

## 2025-02-25 RX ORDER — SODIUM CHLORIDE 9 MG/ML
20 INJECTION, SOLUTION INTRAVENOUS CONTINUOUS
OUTPATIENT
Start: 2025-02-25 | End: 2025-02-26

## 2025-02-27 PROCEDURE — 88302 TISSUE EXAM BY PATHOLOGIST: CPT | Performed by: STUDENT IN AN ORGANIZED HEALTH CARE EDUCATION/TRAINING PROGRAM

## 2025-02-27 PROCEDURE — 88302 TISSUE EXAM BY PATHOLOGIST: CPT

## 2025-02-28 ENCOUNTER — LAB REQUISITION (OUTPATIENT)
Dept: LAB | Facility: HOSPITAL | Age: 35
End: 2025-02-28
Payer: MEDICARE

## 2025-02-28 DIAGNOSIS — Z30.2 ENCOUNTER FOR STERILIZATION: ICD-10-CM

## 2025-03-06 ASSESSMENT — PROMIS GLOBAL HEALTH SCALE
EMOTIONAL_PROBLEMS: RARELY
RATE_GENERAL_HEALTH: VERY GOOD
RATE_AVERAGE_FATIGUE: MILD
CARRYOUT_SOCIAL_ACTIVITIES: VERY GOOD
RATE_PHYSICAL_HEALTH: GOOD
CARRYOUT_PHYSICAL_ACTIVITIES: COMPLETELY
RATE_QUALITY_OF_LIFE: VERY GOOD
RATE_SOCIAL_SATISFACTION: VERY GOOD
RATE_AVERAGE_PAIN: 1
RATE_MENTAL_HEALTH: VERY GOOD

## 2025-03-10 LAB
LABORATORY COMMENT REPORT: NORMAL
PATH REPORT.FINAL DX SPEC: NORMAL
PATH REPORT.GROSS SPEC: NORMAL
PATH REPORT.RELEVANT HX SPEC: NORMAL
PATH REPORT.TOTAL CANCER: NORMAL

## 2025-03-12 ENCOUNTER — APPOINTMENT (OUTPATIENT)
Dept: PRIMARY CARE | Facility: CLINIC | Age: 35
End: 2025-03-12
Payer: MEDICARE

## 2025-03-13 ENCOUNTER — APPOINTMENT (OUTPATIENT)
Dept: PRIMARY CARE | Facility: CLINIC | Age: 35
End: 2025-03-13
Payer: MEDICARE

## 2025-03-13 VITALS
WEIGHT: 140 LBS | DIASTOLIC BLOOD PRESSURE: 88 MMHG | SYSTOLIC BLOOD PRESSURE: 133 MMHG | HEIGHT: 58 IN | OXYGEN SATURATION: 99 % | BODY MASS INDEX: 29.39 KG/M2 | HEART RATE: 110 BPM

## 2025-03-13 DIAGNOSIS — Z13.220 LIPID SCREENING: ICD-10-CM

## 2025-03-13 DIAGNOSIS — Z82.61 FAMILY HISTORY OF RHEUMATOID ARTHRITIS: ICD-10-CM

## 2025-03-13 DIAGNOSIS — Z00.00 WELLNESS EXAMINATION: Primary | ICD-10-CM

## 2025-03-13 DIAGNOSIS — M25.59 PAIN IN OTHER JOINT: ICD-10-CM

## 2025-03-13 PROBLEM — R10.31 RIGHT LOWER QUADRANT ABDOMINAL PAIN: Status: RESOLVED | Noted: 2024-01-11 | Resolved: 2025-03-13

## 2025-03-13 PROBLEM — R10.9 RIGHT FLANK PAIN: Status: RESOLVED | Noted: 2024-01-11 | Resolved: 2025-03-13

## 2025-03-13 PROCEDURE — 3008F BODY MASS INDEX DOCD: CPT | Performed by: NURSE PRACTITIONER

## 2025-03-13 PROCEDURE — 1036F TOBACCO NON-USER: CPT | Performed by: NURSE PRACTITIONER

## 2025-03-13 PROCEDURE — 99395 PREV VISIT EST AGE 18-39: CPT | Performed by: NURSE PRACTITIONER

## 2025-03-13 ASSESSMENT — PATIENT HEALTH QUESTIONNAIRE - PHQ9
SUM OF ALL RESPONSES TO PHQ9 QUESTIONS 1 AND 2: 0
2. FEELING DOWN, DEPRESSED OR HOPELESS: NOT AT ALL
1. LITTLE INTEREST OR PLEASURE IN DOING THINGS: NOT AT ALL

## 2025-03-13 NOTE — PROGRESS NOTES
"Subjective   Patient ID: Mary Carlos is a 34 y.o. female who presents for Annual Exam (Patient is here today for a physical, no complaints).    34 year old female here for annual CPE  2 weeks ago had a Tubal ligation. Doing well.   Mom had RA. She wants monitored with labs annually  Congenital Left Kidney agenesis- saw renal for workup.     Prevention:  Breast Ca screen: no fam HX  Colon Ca Screen: maternal Grandma  Lung Ca Screen: mom- was a heavy smoker- small Cell. Pt quit smoking 2022  DEXA: no fam HX OP  CT Cardiac Score: no fam HX  Diabetes Screen: no fam HX  Opthal: no vision concern.   Dental: starting to get a  routine now that she has ins  Exercise: run weekly.Yoga  Work FT: owner and . Cleaning [employer]  Diet: caffeine- rare. Minimal sweets. High salt diet. Vegetarian.             Review of Systems    Objective   /88   Pulse 110   Ht 1.473 m (4' 10\")   Wt 63.5 kg (140 lb)   SpO2 99%   BMI 29.26 kg/m²     Physical Exam  Constitutional:       Appearance: Normal appearance. She is obese.   HENT:      Head: Normocephalic and atraumatic.   Eyes:      Extraocular Movements: Extraocular movements intact.      Pupils: Pupils are equal, round, and reactive to light.   Cardiovascular:      Rate and Rhythm: Normal rate and regular rhythm.      Pulses: Normal pulses.      Heart sounds: Normal heart sounds.   Pulmonary:      Effort: Pulmonary effort is normal.      Breath sounds: Normal breath sounds.   Musculoskeletal:         General: Normal range of motion.      Cervical back: Normal range of motion.   Lymphadenopathy:      Cervical: No cervical adenopathy.   Skin:     General: Skin is warm and dry.   Neurological:      General: No focal deficit present.      Mental Status: She is alert and oriented to person, place, and time. Mental status is at baseline.   Psychiatric:         Mood and Affect: Mood normal.         Behavior: Behavior normal.         Thought Content: Thought " content normal.         Assessment/Plan   Diagnoses and all orders for this visit:  Wellness examination  -     CBC and Auto Differential; Future  -     Comprehensive Metabolic Panel; Future  -     Lipid Panel; Future  Family history of rheumatoid arthritis  -     C-Reactive Protein; Future  -     Sedimentation Rate; Future  -     Rheumatoid factor; Future  Pain in other joint  Comments:  chronic LT knee pain  Lipid screening  -     Lipid Panel; Future  Other orders  -     Follow Up In Primary Care - Health Maintenance  -     Follow Up In Primary Care - Health Maintenance; Future

## 2025-05-25 LAB
ALBUMIN SERPL-MCNC: 4.6 G/DL (ref 3.6–5.1)
ALP SERPL-CCNC: 62 U/L (ref 31–125)
ALT SERPL-CCNC: 12 U/L (ref 6–29)
ANION GAP SERPL CALCULATED.4IONS-SCNC: 11 MMOL/L (CALC) (ref 7–17)
AST SERPL-CCNC: 16 U/L (ref 10–30)
BASOPHILS # BLD AUTO: 39 CELLS/UL (ref 0–200)
BASOPHILS NFR BLD AUTO: 0.7 %
BILIRUB SERPL-MCNC: 0.6 MG/DL (ref 0.2–1.2)
BUN SERPL-MCNC: 10 MG/DL (ref 7–25)
CALCIUM SERPL-MCNC: 9.6 MG/DL (ref 8.6–10.2)
CHLORIDE SERPL-SCNC: 101 MMOL/L (ref 98–110)
CHOLEST SERPL-MCNC: 206 MG/DL
CHOLEST/HDLC SERPL: 2.6 (CALC)
CO2 SERPL-SCNC: 24 MMOL/L (ref 20–32)
CREAT SERPL-MCNC: 0.73 MG/DL (ref 0.5–0.97)
CRP SERPL-MCNC: NORMAL MG/L
EGFRCR SERPLBLD CKD-EPI 2021: 111 ML/MIN/1.73M2
EOSINOPHIL # BLD AUTO: 140 CELLS/UL (ref 15–500)
EOSINOPHIL NFR BLD AUTO: 2.5 %
ERYTHROCYTE [DISTWIDTH] IN BLOOD BY AUTOMATED COUNT: 12.3 % (ref 11–15)
ERYTHROCYTE [SEDIMENTATION RATE] IN BLOOD BY WESTERGREN METHOD: 17 MM/H
GLUCOSE SERPL-MCNC: 90 MG/DL (ref 65–99)
HCT VFR BLD AUTO: 39.7 % (ref 35–45)
HDLC SERPL-MCNC: 80 MG/DL
HGB BLD-MCNC: 13.2 G/DL (ref 11.7–15.5)
LDLC SERPL CALC-MCNC: 99 MG/DL (CALC)
LYMPHOCYTES # BLD AUTO: 1568 CELLS/UL (ref 850–3900)
LYMPHOCYTES NFR BLD AUTO: 28 %
MCH RBC QN AUTO: 31.6 PG (ref 27–33)
MCHC RBC AUTO-ENTMCNC: 33.2 G/DL (ref 32–36)
MCV RBC AUTO: 95 FL (ref 80–100)
MONOCYTES # BLD AUTO: 510 CELLS/UL (ref 200–950)
MONOCYTES NFR BLD AUTO: 9.1 %
NEUTROPHILS # BLD AUTO: 3343 CELLS/UL (ref 1500–7800)
NEUTROPHILS NFR BLD AUTO: 59.7 %
NONHDLC SERPL-MCNC: 126 MG/DL (CALC)
PLATELET # BLD AUTO: 348 THOUSAND/UL (ref 140–400)
PMV BLD REES-ECKER: 10.1 FL (ref 7.5–12.5)
POTASSIUM SERPL-SCNC: 4.1 MMOL/L (ref 3.5–5.3)
PROT SERPL-MCNC: 7.6 G/DL (ref 6.1–8.1)
RBC # BLD AUTO: 4.18 MILLION/UL (ref 3.8–5.1)
RHEUMATOID FACT SERPL-ACNC: NORMAL [IU]/ML
SODIUM SERPL-SCNC: 136 MMOL/L (ref 135–146)
TRIGL SERPL-MCNC: 169 MG/DL
WBC # BLD AUTO: 5.6 THOUSAND/UL (ref 3.8–10.8)

## 2025-05-27 LAB
ALBUMIN SERPL-MCNC: 4.6 G/DL (ref 3.6–5.1)
ALP SERPL-CCNC: 62 U/L (ref 31–125)
ALT SERPL-CCNC: 12 U/L (ref 6–29)
ANION GAP SERPL CALCULATED.4IONS-SCNC: 11 MMOL/L (CALC) (ref 7–17)
AST SERPL-CCNC: 16 U/L (ref 10–30)
BASOPHILS # BLD AUTO: 39 CELLS/UL (ref 0–200)
BASOPHILS NFR BLD AUTO: 0.7 %
BILIRUB SERPL-MCNC: 0.6 MG/DL (ref 0.2–1.2)
BUN SERPL-MCNC: 10 MG/DL (ref 7–25)
CALCIUM SERPL-MCNC: 9.6 MG/DL (ref 8.6–10.2)
CHLORIDE SERPL-SCNC: 101 MMOL/L (ref 98–110)
CHOLEST SERPL-MCNC: 206 MG/DL
CHOLEST/HDLC SERPL: 2.6 (CALC)
CO2 SERPL-SCNC: 24 MMOL/L (ref 20–32)
CREAT SERPL-MCNC: 0.73 MG/DL (ref 0.5–0.97)
CRP SERPL-MCNC: 3.4 MG/L
EGFRCR SERPLBLD CKD-EPI 2021: 111 ML/MIN/1.73M2
EOSINOPHIL # BLD AUTO: 140 CELLS/UL (ref 15–500)
EOSINOPHIL NFR BLD AUTO: 2.5 %
ERYTHROCYTE [DISTWIDTH] IN BLOOD BY AUTOMATED COUNT: 12.3 % (ref 11–15)
ERYTHROCYTE [SEDIMENTATION RATE] IN BLOOD BY WESTERGREN METHOD: 17 MM/H
GLUCOSE SERPL-MCNC: 90 MG/DL (ref 65–99)
HCT VFR BLD AUTO: 39.7 % (ref 35–45)
HDLC SERPL-MCNC: 80 MG/DL
HGB BLD-MCNC: 13.2 G/DL (ref 11.7–15.5)
LDLC SERPL CALC-MCNC: 99 MG/DL (CALC)
LYMPHOCYTES # BLD AUTO: 1568 CELLS/UL (ref 850–3900)
LYMPHOCYTES NFR BLD AUTO: 28 %
MCH RBC QN AUTO: 31.6 PG (ref 27–33)
MCHC RBC AUTO-ENTMCNC: 33.2 G/DL (ref 32–36)
MCV RBC AUTO: 95 FL (ref 80–100)
MONOCYTES # BLD AUTO: 510 CELLS/UL (ref 200–950)
MONOCYTES NFR BLD AUTO: 9.1 %
NEUTROPHILS # BLD AUTO: 3343 CELLS/UL (ref 1500–7800)
NEUTROPHILS NFR BLD AUTO: 59.7 %
NONHDLC SERPL-MCNC: 126 MG/DL (CALC)
PLATELET # BLD AUTO: 348 THOUSAND/UL (ref 140–400)
PMV BLD REES-ECKER: 10.1 FL (ref 7.5–12.5)
POTASSIUM SERPL-SCNC: 4.1 MMOL/L (ref 3.5–5.3)
PROT SERPL-MCNC: 7.6 G/DL (ref 6.1–8.1)
RBC # BLD AUTO: 4.18 MILLION/UL (ref 3.8–5.1)
RHEUMATOID FACT SERPL-ACNC: <10 IU/ML
SODIUM SERPL-SCNC: 136 MMOL/L (ref 135–146)
TRIGL SERPL-MCNC: 169 MG/DL
WBC # BLD AUTO: 5.6 THOUSAND/UL (ref 3.8–10.8)

## 2026-03-19 ENCOUNTER — APPOINTMENT (OUTPATIENT)
Dept: PRIMARY CARE | Facility: CLINIC | Age: 36
End: 2026-03-19
Payer: MEDICARE